# Patient Record
Sex: FEMALE | Race: WHITE | URBAN - METROPOLITAN AREA
[De-identification: names, ages, dates, MRNs, and addresses within clinical notes are randomized per-mention and may not be internally consistent; named-entity substitution may affect disease eponyms.]

---

## 2018-11-04 ENCOUNTER — OFFICE VISIT (OUTPATIENT)
Dept: URGENT CARE | Facility: URGENT CARE | Age: 53
End: 2018-11-04

## 2018-11-04 VITALS
TEMPERATURE: 97.6 F | SYSTOLIC BLOOD PRESSURE: 102 MMHG | WEIGHT: 134.2 LBS | OXYGEN SATURATION: 96 % | DIASTOLIC BLOOD PRESSURE: 70 MMHG | HEART RATE: 88 BPM

## 2018-11-04 DIAGNOSIS — M27.3 INFECTION OF TOOTH SOCKET: Primary | ICD-10-CM

## 2018-11-04 PROCEDURE — 99203 OFFICE O/P NEW LOW 30 MIN: CPT | Performed by: PHYSICIAN ASSISTANT

## 2018-11-04 RX ORDER — CLINDAMYCIN HCL 150 MG
450 CAPSULE ORAL 3 TIMES DAILY
Qty: 90 CAPSULE | Refills: 0 | Status: SHIPPED | OUTPATIENT
Start: 2018-11-04 | End: 2018-11-14

## 2018-11-04 NOTE — MR AVS SNAPSHOT
"              After Visit Summary   11/4/2018    Perri Hameed    MRN: [id number]           Patient Information     Date Of Birth          1965        Visit Information        Provider Department      11/4/2018 6:45 PM Amor Alcaraz PA-C [facility]        Today's Diagnoses     Infection of tooth socket    -  1      Care Instructions    This is not a complete treatment.  You must be seen by Dentist this week.     Please supplement oral antibiotic with probiotic (ie culturelle) daily for 4 weeks   *DENTAL PAIN    A crack or cavity in the tooth, which exposes the sensitive inner area of the tooth can cause tooth pain. An infection in the gum or the root of the tooth can cause pain and swelling. The pain is often made worse by drinking hot or cold fluids, or biting on hard foods. Pain may spread from the tooth to the ear or jaw on the same side.  HOME CARE:    1. Avoid hot and cold foods and liquids since your tooth may be sensitive to temperature changes.  2. If your tooth is chipped or cracked, or if there is a large open cavity, apply OIL OF CLOVES (available over-the-counter in drug stores) directly to the tooth to reduce pain. Some pharmacies carry an over-the-counter \"toothache kit.\" This contains a paste, which can be applied over the exposed tooth to decrease sensitivity. This is only a temporary solution. See a dentist as soon as possible to fix the tooth.  3. A cold pack on your jaw over the sore area may help reduce pain.  4. You may use acetaminophen (Tylenol) 650-1000 mg every 6 hours or ibuprofen (Motrin, Advil) 600 mg every 6-8 hours with food to control pain, if you are able to take these medicines. [ NOTE: If you have chronic liver or kidney disease or ever had a stomach ulcer or GI bleeding, talk with your doctor before using these medicines.]  5. If you have signs of an infection, an antibiotic will be given. Take it as directed.  FOLLOW-UP as directed with a dentist. " "Your pain may go away with the treatment given. However, only a dentist can fully evaluate and treat the cause and prevent the pain from coming back again.  TOOTHACHE IS A SIGN OF DISEASE IN YOUR TOOTH AND SHOULD BE EXAMINED AND TREATED BY A DENTIST.  GET PROMPT MEDICAL ATTENTION if any of the following occur:    Your face becomes swollen or red    Pain worsens or spreads to the neck    Fever over 101  F (38.3  C)    Unusual drowsiness; headache or stiff neck; weakness or fainting    Pus drains from the tooth    Difficulty swallowing or breathing       2155-4818 [employer]. [address] [place] [address]. All rights reserved. This information is not intended as a substitute for professional medical care. Always follow your healthcare professional's instructions.  This information has been modified by your health care provider with permission from the publisher.            Follow-ups after your visit        Who to contact     If you have questions or need follow up information about today's clinic visit or your schedule please contact [facility] directly at [phone].  Normal or non-critical lab and imaging results will be communicated to you by Get [employer], letter or phone within 4 business days after the clinic has received the results. If you do not hear from us within 7 days, please contact the clinic through [employer] or phone. If you have a critical or abnormal lab result, we will notify you by phone as soon as possible.  Submit refill requests through [employer] or call your pharmacy and they will forward the refill request to us. Please allow 3 business days for your refill to be completed.          Additional Information About Your Visit        My[employer] Information     [employer] lets you send messages to your doctor, view your test results, renew your prescriptions, schedule appointments and more. To sign up, go to [email or web address] . Click on \"Log in\" on the left side " "of the screen, which will take you to the Welcome page. Then click on \"Sign up Now\" on the right side of the page.     You will be asked to enter the access code listed below, as well as some personal information. Please follow the directions to create your username and password.     Your access code is: [email or web address]  Expires: 2019  7:18 PM     Your access code will  in 90 days. If you need help or a new code, please call your [facility]) or [phone].        Care EveryWhere ID     This is your Care EveryWhere ID. This could be used by other organizations to access your [facility] medical records  [id number]        Your Vitals Were     Pulse Temperature Pulse Oximetry             88 97.6  F (36.4  C) (Tympanic) 96%          Blood Pressure from Last 3 Encounters:   18 102/70    Weight from Last 3 Encounters:   18 134 lb 3.2 oz (60.9 kg)              Today, you had the following     No orders found for display         Today's Medication Changes          These changes are accurate as of 18  7:18 PM.  If you have any questions, ask your nurse or doctor.               Start taking these medicines.        Dose/Directions    clindamycin 150 MG capsule   Commonly known as:  CLEOCIN   Used for:  Infection of tooth socket        Dose:  450 mg   Take 3 capsules (450 mg) by mouth 3 times daily for 10 days   Quantity:  90 capsule   Refills:  0            Where to get your medicines      These medications were sent to [facility] [address] [place] [id number]     Phone:  [phone]     clindamycin 150 MG capsule                Primary Care Provider Fax #    Physician No Ref-Primary [phone]       No address on file        Equal Access to Services     DIEGO INIGUEZ : Raji Watts, eileen mullnis, rc stevens. So [facility] " [phone].    ATENCIÓN: Si shara ansari, tiene a de leon disposición servicios gratuitos de asistencia lingüística. [place] al [phone].    We comply with applicable federal civil rights laws and [place] laws. We do not discriminate on the basis of race, color, national origin, age, disability, sex, sexual orientation, or gender identity.            Thank you!     Thank you for choosing [facility]  for your care. Our goal is always to provide you with excellent care. Hearing back from our patients is one way we can continue to improve our services. Please take a few minutes to complete the written survey that you may receive in the mail after your visit with us. Thank you!             Your Updated Medication List - Protect others around you: Learn how to safely use, store and throw away your medicines at www.disposemymeds.org.          This list is accurate as of 11/4/18  7:18 PM.  Always use your most recent med list.                   Brand Name Dispense Instructions for use Diagnosis    clindamycin 150 MG capsule    CLEOCIN    90 capsule    Take 3 capsules (450 mg) by mouth 3 times daily for 10 days    Infection of tooth socket

## 2018-11-05 NOTE — PROGRESS NOTES
"SUBJECTIVE:  Perri Hameed is a 53 year old female who presents to the clinic today for a recurrent tooth abcess worsening 2 days ago. Location of the rash: jaw.  Quality/symptoms of rash: painful and red   Symptoms are moderate and rash seems to be worsening.  Previous history of a similar rash? Yes: dental infection  Recent exposure history: none known    Associated symptoms include: nothing.    No antipyretics on hand.        No past medical history on file.  No current outpatient prescriptions on file.     Social History   Substance Use Topics     Smoking status: Not on file     Smokeless tobacco: Not on file     Alcohol use Not on file       ROS:  Review of systems negative except as stated above.    EXAM:   /70  Pulse 88  Temp 97.6  F (36.4  C) (Tympanic)  Wt 134 lb 3.2 oz (60.9 kg)  SpO2 96%  GENERAL: alert, no acute distress.  ENT: swelling and erythema of gums at site of dental decay    ASSESSMENT:  (M27.3) Infection of tooth socket  (primary encounter diagnosis)  Plan: clindamycin (CLEOCIN) 150 MG capsule      Patient Instructions   This is not a complete treatment.  You must be seen by Dentist this week.     Please supplement oral antibiotic with probiotic (ie culturelle) daily for 4 weeks   *DENTAL PAIN    A crack or cavity in the tooth, which exposes the sensitive inner area of the tooth can cause tooth pain. An infection in the gum or the root of the tooth can cause pain and swelling. The pain is often made worse by drinking hot or cold fluids, or biting on hard foods. Pain may spread from the tooth to the ear or jaw on the same side.  HOME CARE:    1. Avoid hot and cold foods and liquids since your tooth may be sensitive to temperature changes.  2. If your tooth is chipped or cracked, or if there is a large open cavity, apply OIL OF CLOVES (available over-the-counter in drug stores) directly to the tooth to reduce pain. Some pharmacies carry an over-the-counter \"toothache kit.\" This contains " a paste, which can be applied over the exposed tooth to decrease sensitivity. This is only a temporary solution. See a dentist as soon as possible to fix the tooth.  3. A cold pack on your jaw over the sore area may help reduce pain.  4. You may use acetaminophen (Tylenol) 650-1000 mg every 6 hours or ibuprofen (Motrin, Advil) 600 mg every 6-8 hours with food to control pain, if you are able to take these medicines. [ NOTE: If you have chronic liver or kidney disease or ever had a stomach ulcer or GI bleeding, talk with your doctor before using these medicines.]  5. If you have signs of an infection, an antibiotic will be given. Take it as directed.  FOLLOW-UP as directed with a dentist. Your pain may go away with the treatment given. However, only a dentist can fully evaluate and treat the cause and prevent the pain from coming back again.  TOOTHACHE IS A SIGN OF DISEASE IN YOUR TOOTH AND SHOULD BE EXAMINED AND TREATED BY A DENTIST.  GET PROMPT MEDICAL ATTENTION if any of the following occur:    Your face becomes swollen or red    Pain worsens or spreads to the neck    Fever over 101  F (38.3  C)    Unusual drowsiness; headache or stiff neck; weakness or fainting    Pus drains from the tooth    Difficulty swallowing or breathing       [id number] [employer]. [address] [place] [address], [place] [address]. All rights reserved. This information is not intended as a substitute for professional medical care. Always follow your healthcare professional's instructions.  This information has been modified by your health care provider with permission from the publisher.

## 2018-11-05 NOTE — PATIENT INSTRUCTIONS
"This is not a complete treatment.  You must be seen by Dentist this week.     Please supplement oral antibiotic with probiotic (ie culturelle) daily for 4 weeks   *DENTAL PAIN    A crack or cavity in the tooth, which exposes the sensitive inner area of the tooth can cause tooth pain. An infection in the gum or the root of the tooth can cause pain and swelling. The pain is often made worse by drinking hot or cold fluids, or biting on hard foods. Pain may spread from the tooth to the ear or jaw on the same side.  HOME CARE:    1. Avoid hot and cold foods and liquids since your tooth may be sensitive to temperature changes.  2. If your tooth is chipped or cracked, or if there is a large open cavity, apply OIL OF CLOVES (available over-the-counter in drug stores) directly to the tooth to reduce pain. Some pharmacies carry an over-the-counter \"toothache kit.\" This contains a paste, which can be applied over the exposed tooth to decrease sensitivity. This is only a temporary solution. See a dentist as soon as possible to fix the tooth.  3. A cold pack on your jaw over the sore area may help reduce pain.  4. You may use acetaminophen (Tylenol) 650-1000 mg every 6 hours or ibuprofen (Motrin, Advil) 600 mg every 6-8 hours with food to control pain, if you are able to take these medicines. [ NOTE: If you have chronic liver or kidney disease or ever had a stomach ulcer or GI bleeding, talk with your doctor before using these medicines.]  5. If you have signs of an infection, an antibiotic will be given. Take it as directed.  FOLLOW-UP as directed with a dentist. Your pain may go away with the treatment given. However, only a dentist can fully evaluate and treat the cause and prevent the pain from coming back again.  TOOTHACHE IS A SIGN OF DISEASE IN YOUR TOOTH AND SHOULD BE EXAMINED AND TREATED BY A DENTIST.  GET PROMPT MEDICAL ATTENTION if any of the following occur:    Your face becomes swollen or red    Pain worsens or " spreads to the neck    Fever over 101  F (38.3  C)    Unusual drowsiness; headache or stiff neck; weakness or fainting    Pus drains from the tooth    Difficulty swallowing or breathing       9526-9134 [employer]. [address] [place] [address], [place] [address]. All rights reserved. This information is not intended as a substitute for professional medical care. Always follow your healthcare professional's instructions.  This information has been modified by your health care provider with permission from the publisher.